# Patient Record
Sex: MALE | URBAN - METROPOLITAN AREA
[De-identification: names, ages, dates, MRNs, and addresses within clinical notes are randomized per-mention and may not be internally consistent; named-entity substitution may affect disease eponyms.]

---

## 2018-10-31 NOTE — PATIENT DISCUSSION
The patient was informed that with the [employer] Multifocal lens, mid-range vision will not be as strong as the distance and near vision and they may require glasses for mid-range activities such as a desktop computer. Side effects, specifically halos, reduced contrast, and a 1 in 500 exchange rate due to failure to adapt to the lens were discussed as was the need for enhancement in some cases. The patient elects TMF +3.25 OD, goal of emmetropia.

## 2018-11-07 NOTE — PROCEDURE NOTE: SURGICAL
<p>Prior to commencing surgery patient identification, surgical procedure, site, and side were confirmed by Dr. Leann Sidhu. Following topical proparacaine anesthesia, the patient was positioned at the YAG laser, a contact lens coupled to the cornea with methylcellulose and an axial posterior capsulotomy performed without complication using 3.3 Mj x 20. Excess methylcellulose was washed from the eye, one drop of Alphagan was instilled and the patient returned to the holding area having tolerated the procedure well and without complication. </p><p>MRN [id number]</p>

## 2018-11-07 NOTE — PATIENT DISCUSSION
Patient advised of the right to post-operative care by the surgeon. Patient is fully informed of, and agreed to, co-management with their primary optometric physician. Post-operative care by the surgeon is not medically necessary and co-management is clinically appropriate. Patient has received itemization of fees related to cataract surgery. Transfer of care letter completed for the patient. Transfer care of Right eye to Dr. Hailey Motley on 11/7/2018. Patient instructed to call immediately if any new distortion, blurring, decreased vision or eye pain.

## 2019-08-15 ENCOUNTER — IMPORTED ENCOUNTER (OUTPATIENT)
Dept: URBAN - METROPOLITAN AREA CLINIC 9 | Facility: CLINIC | Age: 39
End: 2019-08-15

## 2019-08-19 ENCOUNTER — IMPORTED ENCOUNTER (OUTPATIENT)
Dept: URBAN - METROPOLITAN AREA CLINIC 9 | Facility: CLINIC | Age: 39
End: 2019-08-19

## 2019-09-24 ENCOUNTER — IMPORTED ENCOUNTER (OUTPATIENT)
Dept: URBAN - METROPOLITAN AREA CLINIC 9 | Facility: CLINIC | Age: 39
End: 2019-09-24

## 2019-09-26 ENCOUNTER — IMPORTED ENCOUNTER (OUTPATIENT)
Dept: URBAN - METROPOLITAN AREA CLINIC 9 | Facility: CLINIC | Age: 39
End: 2019-09-26

## 2021-10-16 ASSESSMENT — KERATOMETRY
OS_K2POWER_DIOPTERS: 43.875
OS_K2POWER_DIOPTERS: 43.75
OS_AXISANGLE_DEGREES: 144
OD_K1POWER_DIOPTERS: 43.125
OS_AXISANGLE2_DEGREES: 54
OS_K1POWER_DIOPTERS: 43
OS_K1POWER_DIOPTERS: 43.125
OD_K2POWER_DIOPTERS: 43.375
OS_AXISANGLE_DEGREES: 142
OD_AXISANGLE2_DEGREES: 112
OD_K2POWER_DIOPTERS: 43.625
OS_AXISANGLE2_DEGREES: 52
OD_AXISANGLE_DEGREES: 22
OD_AXISANGLE2_DEGREES: 117
OD_AXISANGLE_DEGREES: 27
OD_K1POWER_DIOPTERS: 43

## 2021-10-16 ASSESSMENT — TONOMETRY
OD_IOP_MMHG: 11
OD_IOP_MMHG: 17
OD_IOP_MMHG: 17
OS_IOP_MMHG: 14
OS_IOP_MMHG: 17
OS_IOP_MMHG: 17

## 2021-10-16 ASSESSMENT — VISUAL ACUITY
OS_CC: 20/30 + SN
OD_CC: 20/40 + SN
OS_CC: 20/20 - SN
OS_CC: 20/25 - SN
OS_CC: 20/30 SN
OD_CC: 20/30 SN
OD_CC: 20/20 - SN
OD_CC: 20/30 -2 SN
OS_CC: 20/30 - SN
OS_SC: 20/400 SN
OD_SC: 20/400 SN
OD_CC: 20/30 - SN
OD_CC: 20/20 -2 SN
OS_CC: 20/50 + SN

## 2024-02-12 ENCOUNTER — COMPREHENSIVE EXAM (OUTPATIENT)
Dept: URBAN - NONMETROPOLITAN AREA CLINIC 6 | Facility: CLINIC | Age: 44
End: 2024-02-12

## 2024-02-12 DIAGNOSIS — H35.30: ICD-10-CM

## 2024-02-12 DIAGNOSIS — H52.13: ICD-10-CM

## 2024-02-12 DIAGNOSIS — H27.113: ICD-10-CM

## 2024-02-12 DIAGNOSIS — H04.123: ICD-10-CM

## 2024-02-12 PROCEDURE — 92004 COMPRE OPH EXAM NEW PT 1/>: CPT

## 2024-02-12 PROCEDURE — 92015 DETERMINE REFRACTIVE STATE: CPT

## 2024-02-12 PROCEDURE — 92134 CPTRZ OPH DX IMG PST SGM RTA: CPT

## 2024-02-12 ASSESSMENT — VISUAL ACUITY
OU_SC: 20/400
OU_CC: 20/25
OS_BCVA: 20/20
OD_SC: CF 3FT
OD_CC: 20/25
OD_GLARE: 20/25
OS_SC: CF 3FT
OS_CC: 20/25
OD_BCVA: 20/25
OS_GLARE: 20/20

## 2024-02-12 ASSESSMENT — TONOMETRY
OS_IOP_MMHG: 18
OD_IOP_MMHG: 17

## 2024-09-03 ENCOUNTER — COMPREHENSIVE EXAM (OUTPATIENT)
Dept: URBAN - NONMETROPOLITAN AREA CLINIC 6 | Facility: CLINIC | Age: 44
End: 2024-09-03

## 2024-09-03 DIAGNOSIS — H35.713: ICD-10-CM

## 2024-09-03 PROCEDURE — 92134 CPTRZ OPH DX IMG PST SGM RTA: CPT

## 2024-09-03 PROCEDURE — 99214 OFFICE O/P EST MOD 30 MIN: CPT
